# Patient Record
Sex: MALE | Race: WHITE
[De-identification: names, ages, dates, MRNs, and addresses within clinical notes are randomized per-mention and may not be internally consistent; named-entity substitution may affect disease eponyms.]

---

## 2019-08-22 ENCOUNTER — HOSPITAL ENCOUNTER (OUTPATIENT)
Age: 64
End: 2019-08-22
Payer: MEDICARE

## 2019-08-22 DIAGNOSIS — N63.41: Primary | ICD-10-CM

## 2019-08-22 PROCEDURE — 77066 DX MAMMO INCL CAD BI: CPT

## 2019-08-22 PROCEDURE — 76642 ULTRASOUND BREAST LIMITED: CPT

## 2019-08-22 NOTE — DI.MG.S_ITS
MALE BILATERAL DIGITAL DIAGNOSTIC MAMMOGRAM 3D/2D: 8/22/2019  
CLINICAL: Right subareolar/retroareolar mass.    
   
No prior exams were available for comparison.    
There is a triangular marker overlying the nipple/retroareolar region of the right breast   
at the site of the patient's reported palpable abnormality.  There is an oval indistinct   
high density mass in the right retroareolar region with associated architectural   
distortion and internal pleomorphic calcifications.  This mass is surrounded by adjacent   
moderate right retroareolar gynecomastia. There is also moderate left retroareolar   
gynecomastia.  The imaged right axillary lymph nodes appear enlarged.  
   
There are multiple bilateral mole markers and vascular calcifications.  
IMPRESSION: INCOMPLETE: NEEDS ADDITIONAL IMAGING EVALUATION  
1) Oval indistinct high density mass in the right retroareolar region with associated   
architectural distortion and internal pleomorphic calcifications. This correlates with   
the patient's palpable abnormality. Targeted diagnostic ultrasound recommended for   
further evaluation, which will be performed immediately following this exam.  
   
2) Enlarged right axillary lymph nodes.  Targeted diagnostic ultrasound recommended for   
further evaluation, which will be performed immediately following this exam.    
   
This exam was interpreted at Station ID: [id number].    
   
NOTE: For mammograms, a report in lay terms will be sent to the patient. Approximately   
15% of breast malignancies will not be visualized mammographically. In the management of   
a palpable breast mass, a negative mammogram must not discourage biopsy of a clinically   
suspicious lesion.  
   
Electronically Signed By: Kenneth Conway M.D.            
ecl/:8/22/2019 13:32:00    
   
   
   
ACR BI-RADS Category 0: Incomplete [id number]

## 2019-08-22 NOTE — DI.US.S_ITS
LIMITED ULTRASOUND OF RIGHT BREAST AND AXILLA: 8/22/2019  
CLINICAL: Palpable right breast lump.    
   
Comparison is made to exam dated:  8/22/2019 mammogram - [facility].    
Color flow and real-time ultrasound of the right breast retroareolar and axilla regions   
were performed.  Gray scale images of the real-time examination were reviewed.    
   
There is a 3.6 x 3.1 x 4.4 cm irregular heterogeneously hypoechoic mass with angular   
margins located in the right retroareolar region. This mass demonstrates internal   
echogenic foci consistent with calcifications and internal vascularity on Doppler   
ultrasound. This correlates with findings seen on comparison mammography.  
   
Targeted ultrasound of the right axilla demonstrates a 2.0 x 1.3 x 2.1 cm oval   
circumscribed hypoechoic mass with internal vascularity on Doppler ultrasound.    
   
IMPRESSION: HIGHLY SUGGESTIVE OF MALIGNANCY   
1) 4.4 cm right retroareolar mass is highly suggestive of malignancy. An ultrasound   
guided biopsy is recommended.  
2) 2.0 cm right axillary mass is most consistent with an abnormal right axillary lymph   
node and is highly suggestive of malignancy. An ultrasound guided biopsy is recommended.  
   
These results and recommendations were discussed with the patient at the time of the exam   
by the [facility] Radiologist Dr. Daron Kathleen in person.    
   
This exam was interpreted at Station ID: [id number].    
Electronically Signed By: Kenneth Conway M.D.    
ecl/:8/22/2019 15:05:30    
   
   
letter sent: Biopsy Required    
Ultrasound BI-RADS: 5 Highly suggestive of malignancy

## 2019-10-15 ENCOUNTER — HOSPITAL ENCOUNTER (OUTPATIENT)
Age: 64
End: 2019-10-15
Payer: MEDICARE

## 2019-10-15 DIAGNOSIS — Z17.0: ICD-10-CM

## 2019-10-15 DIAGNOSIS — C50.121: Primary | ICD-10-CM

## 2019-10-15 PROCEDURE — 76942 ECHO GUIDE FOR BIOPSY: CPT

## 2019-10-15 PROCEDURE — 38505 NEEDLE BIOPSY LYMPH NODES: CPT

## 2019-10-15 PROCEDURE — 19083 BX BREAST 1ST LESION US IMAG: CPT

## 2019-10-15 PROCEDURE — 77065 DX MAMMO INCL CAD UNI: CPT

## 2019-10-15 NOTE — PATH_ITS
"  
LCA Accession Number: [id number]  
.                                                                01  
Material submitted:                                        .  
PART A: axilla - RIGHT AXILLARY LYMPH NODE  
PART B: breast - RIGHT BREAST RETROAREOLAR MASS  
.                                                                01  
Clinical history:                                          .  
RIGHT BREAST CANCER  
.                                                                01  
**********************************************************************  
Diagnosis:  
A.  Right Axillary  Lymph Node , Biopsy:  
     Minute fragment of fibrous tissue.  
     Lymphoid tissue not present.  
.  
B.  Right Breast, Retroareolar, Needle Core Biopsy:  
     Invasive ductal carcinoma, well differentiated.  
      -[place] score: 5 out of 9 possible (histologic=2, nuclear=2,  
       mitotic index=1).  
      -Greatest linear length of invasive carcinoma: 1.2 cm as measured  
       from the glass slide.  
      -In situ carcinoma: Not identified.  
      -Lymphovascular invasion: Not identified.  
      -Predictive markers: Performed by immunohistochemistry, see comment  
       section.  
V  10/17/2019  1538 Local  
**********************************************************************  
.                                                                01  
Comment:  
Predictive marker immunohistochemical studies are performed on block B1  
with the invasive carcinoma showing the following results:  
.  
Estrogen receptor (SP1): Positive (>95%, strong intensity).  
Progesterone receptor (1E2): Positive (>95%, strong intensity).  
Her2 (4B5): Negative for overexpression (1+).  
.  
Internal controls for ER and CO are not present. Cold ischemic time is not  
provided. The scoring criteria for breast biomarkers by  
immunohistochemistry is based on the ASCO/CAP guidelines (Dm AC et al,  
J Clin Oncol: 2018 Jul 10;36(20):1945-1969 and Britney COLVIN et al, Arch  
Pathol Lab Med: 2010 Jun;134(6):907-22). Deparaffinized sections of  
formalin fixed tissue (along with appropriate positive controls) are  
incubated with the above antibody(s).  Using the automated [place]  
stainer, tissue is incubated with the designated antibody which is then  
localized by a non-biotin, dual polymer detection system.  The external  
controls are reviewed for appropriate reactivity and found to be adequate.  
Results on the target cell population are indicated above. These tests  
have not been validated on decalcified or alcohol-based fixed tissue. This  
test was developed and its performance characteristics determined by  
[employer]. It has not been cleared or approved by the U.S. Food and Drug  
Administration. The FDA has determined that such clearance or approval is  
not necessary. This test is used for clinical purposes. It should not be  
regarded as investigational or for research.  
.  
The results of this case are verbally provided by Dr. Shah to Nurse  
Leatha, in Dr. Chamberlain's office, on 10/16/2019 at 4:30 p.m.  
.                                                                01  
Electronically signed:                                     .  
Monica Shah MD, Pathologist  
NPI- [id number]  
.                                                                01  
Gross description:                                         .  
Received two formalin-filled containers, both labeled with the patient's  
name:  
.  
A.  In a container labeled  #1. Axilla , the specimen consists of an  
    extremely scant aggregate of light gray tissue which is filtered,  
    wrapped, and entirely submitted in cassette A. May not survive  
    processing.  
B.  In a container labeled  #2. Brst , the specimen is received with a  
    plastic filter in container, sample loose in container and consists of  
    three 0.2 cm in diameter, cylindrical-shaped portions of tissue which  
  
[id number]|2019-10-15 08:22:00|2019-10-15 10:13:00|DI.US.S_ITS|[facility]|Imaging|[id number]|"ULTRASOUND GUIDED BIOPSY RIGHT BREAST USING VACUUM DEVICE WITH MARKING DEVICE INSERTED

## 2019-10-15 NOTE — DI.US.S_ITS
ULTRASOUND GUIDED BIOPSY RIGHT BREAST WITH MARKING DEVICE INSERTED AND POST MAMMOGRAPHIC   
IMAGING: 10/15/2019  
CLINICAL: Right axillary node biopsy.    
   
PATIENT CONSENT: Risks (minor bleeding, infection, vasovagal reaction and repeat   
procedure), benefits and alternatives were explained to the patient and written informed   
consent was obtained.    
   
Correlation is made to exams dated:  8/22/2019 ultrasound and 8/22/2019 mammogram [facility].    
An ultrasound guided biopsy using real-time ultrasound was performed for the concerning   
circumscribed oval lymph node located in the right axillary tail.  This was described on   
the previous ultrasound report.  The skin was prepped in the usual manner.  Local   
anesthetic was administered to the access site.  A skin nick was made in the breast.  The   
abnormality was approached from the lateral aspect.  An 18 gauge biopsy needle was placed   
adjacent to the abnormality through an introducer device under ultrasound guidance.  Once   
the needle was documented to be in the correct location, four specimens were obtained   
using 18G Tem[place] via trochar.  A Vision clip was inserted into the biopsy cavity.  A skin   
closure strip and a sterile dressing were applied to the access site.  Post procedure   
mammographic imaging demonstrates the location device at the targeted area.  The   
specimens were sent to the laboratory for pathological analysis.    
   
   
IMPRESSION: ULTRASOUND GUIDED BIOPSY BENIGN   
Ultrasound guided biopsy of the lymph node in the right axillary tail was successful.    
Pathology indicates benign fibrosis without lymphoid tissue present.  Pathology results   
are discordant with imaging findings.  A surgical excision or sentinal node biopsy is   
recommended.    
   
   
This exam was interpreted at Station ID: [id number].    
   
Estevan Martin M.D.  
sdh,krg/:10/21/2019 19:40:02

## 2019-10-15 NOTE — DI.MG.S_ITS
MALE UNILATERAL RIGHT DIGITAL DIAGNOSTIC MAMMOGRAM POST-NEEDLE BIOPSY: 10/15/2019  
CLINICAL: Right breast cancer.    
   
Comparison is made to exam dated:  8/22/2019 [facility].    
There is a marker clip in the appropriate position in the right breast central to the   
nipple in the retroareolar region.  This is not significantly changed and correlates as   
palpated, with ultrasound findings, and the biopsy.  There is a biopsy clip and axillary   
adenopathy associated with the mass.    
   
   
IMPRESSION: POST PROCEDURE MAMMOGRAM FOR MARKER PLACEMENT  
There was a successful marker clip placement in the right breast central to the nipple in   
the retroareolar region.  The Vision marker placed at the right axillary node biopsy site   
could nt be included in this mammogram.    
   
   
This exam was interpreted at Station ID: [id number].    
   
NOTE: For mammograms, a report in lay terms will be sent to the patient. Approximately   
15% of breast malignancies will not be visualized mammographically. In the management of   
a palpable breast mass, a negative mammogram must not discourage biopsy of a clinically   
suspicious lesion.  
   
Electronically Signed By: Estevan Hughes M.D.            
sdh/:10/15/2019 17:30:04    
   
   
   
ACR BI-RADS Category Post-procedure mammogram for marker placement

## 2019-10-29 NOTE — ONC.MSW
Description: Initial Referral Navigation T/C

Activity: Called and left message for pt introducing myself as the navigator, needing some more information in order to move forward with scheduling. Requested return call.

## 2019-11-05 ENCOUNTER — HOSPITAL ENCOUNTER (INPATIENT)
Age: 64
LOS: 1 days | Discharge: HOME | DRG: 579 | End: 2019-11-06
Payer: MEDICARE

## 2019-11-05 VITALS
DIASTOLIC BLOOD PRESSURE: 88 MMHG | SYSTOLIC BLOOD PRESSURE: 138 MMHG | RESPIRATION RATE: 16 BRPM | OXYGEN SATURATION: 99 % | TEMPERATURE: 96.62 F | HEART RATE: 106 BPM

## 2019-11-05 VITALS
OXYGEN SATURATION: 96 % | DIASTOLIC BLOOD PRESSURE: 99 MMHG | RESPIRATION RATE: 14 BRPM | HEART RATE: 110 BPM | TEMPERATURE: 96.62 F | SYSTOLIC BLOOD PRESSURE: 135 MMHG

## 2019-11-05 VITALS
RESPIRATION RATE: 12 BRPM | OXYGEN SATURATION: 95 % | DIASTOLIC BLOOD PRESSURE: 87 MMHG | SYSTOLIC BLOOD PRESSURE: 124 MMHG | HEART RATE: 107 BPM

## 2019-11-05 VITALS
RESPIRATION RATE: 16 BRPM | SYSTOLIC BLOOD PRESSURE: 114 MMHG | OXYGEN SATURATION: 99 % | HEART RATE: 99 BPM | TEMPERATURE: 97.7 F | DIASTOLIC BLOOD PRESSURE: 86 MMHG

## 2019-11-05 VITALS
TEMPERATURE: 97.52 F | OXYGEN SATURATION: 100 % | DIASTOLIC BLOOD PRESSURE: 84 MMHG | SYSTOLIC BLOOD PRESSURE: 128 MMHG | HEART RATE: 116 BPM | RESPIRATION RATE: 18 BRPM

## 2019-11-05 VITALS — BODY MASS INDEX: 33.2 KG/M2

## 2019-11-05 VITALS
HEART RATE: 104 BPM | DIASTOLIC BLOOD PRESSURE: 47 MMHG | OXYGEN SATURATION: 95 % | SYSTOLIC BLOOD PRESSURE: 102 MMHG | RESPIRATION RATE: 11 BRPM

## 2019-11-05 VITALS
RESPIRATION RATE: 14 BRPM | HEART RATE: 108 BPM | DIASTOLIC BLOOD PRESSURE: 61 MMHG | SYSTOLIC BLOOD PRESSURE: 107 MMHG | OXYGEN SATURATION: 97 %

## 2019-11-05 VITALS
SYSTOLIC BLOOD PRESSURE: 114 MMHG | TEMPERATURE: 97.34 F | RESPIRATION RATE: 11 BRPM | OXYGEN SATURATION: 97 % | DIASTOLIC BLOOD PRESSURE: 88 MMHG | HEART RATE: 109 BPM

## 2019-11-05 VITALS
OXYGEN SATURATION: 97 % | RESPIRATION RATE: 14 BRPM | DIASTOLIC BLOOD PRESSURE: 80 MMHG | SYSTOLIC BLOOD PRESSURE: 116 MMHG | HEART RATE: 95 BPM

## 2019-11-05 VITALS
DIASTOLIC BLOOD PRESSURE: 74 MMHG | HEART RATE: 110 BPM | OXYGEN SATURATION: 95 % | TEMPERATURE: 97.34 F | RESPIRATION RATE: 15 BRPM | SYSTOLIC BLOOD PRESSURE: 104 MMHG

## 2019-11-05 VITALS
HEART RATE: 110 BPM | OXYGEN SATURATION: 95 % | DIASTOLIC BLOOD PRESSURE: 68 MMHG | RESPIRATION RATE: 13 BRPM | SYSTOLIC BLOOD PRESSURE: 113 MMHG

## 2019-11-05 VITALS
RESPIRATION RATE: 11 BRPM | SYSTOLIC BLOOD PRESSURE: 100 MMHG | OXYGEN SATURATION: 94 % | DIASTOLIC BLOOD PRESSURE: 68 MMHG | HEART RATE: 100 BPM

## 2019-11-05 VITALS
HEART RATE: 104 BPM | OXYGEN SATURATION: 100 % | RESPIRATION RATE: 16 BRPM | DIASTOLIC BLOOD PRESSURE: 76 MMHG | TEMPERATURE: 97.4 F | SYSTOLIC BLOOD PRESSURE: 133 MMHG

## 2019-11-05 VITALS
HEART RATE: 107 BPM | RESPIRATION RATE: 16 BRPM | DIASTOLIC BLOOD PRESSURE: 84 MMHG | SYSTOLIC BLOOD PRESSURE: 129 MMHG | TEMPERATURE: 96.62 F | OXYGEN SATURATION: 94 %

## 2019-11-05 VITALS
OXYGEN SATURATION: 97 % | DIASTOLIC BLOOD PRESSURE: 71 MMHG | HEART RATE: 104 BPM | RESPIRATION RATE: 16 BRPM | SYSTOLIC BLOOD PRESSURE: 102 MMHG | TEMPERATURE: 98.78 F

## 2019-11-05 DIAGNOSIS — Z23: ICD-10-CM

## 2019-11-05 DIAGNOSIS — C50.921: Primary | ICD-10-CM

## 2019-11-05 DIAGNOSIS — Z99.2: ICD-10-CM

## 2019-11-05 DIAGNOSIS — I12.0: ICD-10-CM

## 2019-11-05 DIAGNOSIS — N18.6: ICD-10-CM

## 2019-11-05 LAB
HEMOLYSIS: 15 (ref 0–50)
POTASSIUM SERPL-SCNC: 5.2 MMOL/L (ref 3.4–5.1)

## 2019-11-05 PROCEDURE — 90471 IMMUNIZATION ADMIN: CPT

## 2019-11-05 PROCEDURE — 36415 COLL VENOUS BLD VENIPUNCTURE: CPT

## 2019-11-05 PROCEDURE — 19302 P-MASTECTOMY W/LN REMOVAL: CPT

## 2019-11-05 PROCEDURE — 78195 LYMPH SYSTEM IMAGING: CPT

## 2019-11-05 PROCEDURE — 82962 GLUCOSE BLOOD TEST: CPT

## 2019-11-05 PROCEDURE — A9541 TC99M SULFUR COLLOID: HCPCS

## 2019-11-05 PROCEDURE — 90656 IIV3 VACC NO PRSV 0.5 ML IM: CPT

## 2019-11-05 PROCEDURE — 0HBT0ZZ EXCISION OF RIGHT BREAST, OPEN APPROACH: ICD-10-PCS | Performed by: SURGERY

## 2019-11-05 PROCEDURE — 84132 ASSAY OF SERUM POTASSIUM: CPT

## 2019-11-05 PROCEDURE — 80048 BASIC METABOLIC PNL TOTAL CA: CPT

## 2019-11-05 NOTE — PM.OP.1
Operative Date/Time/Diagnoses
Date of procedure: 11/05/19
Time of procedure: 15:59
Pre-op diagnosis: Right breast cancer 

Post-op diagnosis: same

Procedure & Clinicians
Procedure: Right mastectomy, right partial axillary lymph node dissection
Same procedure as scheduled: Yes
Indications: This is a 64-year-old male with biopsy-proven invasive ductal carcinoma of the right breast with a 4.5 cm mass.  In addition he has prominent palpable axillary lymphadenopathy.  He underwent a needle-guided biopsy of the axillary tissue 
but this was nondiagnostic as no lymphoid tissue was obtained.  He presents today for a right mastectomy sentinel lymph node biopsy and possible limited axillary dissection.
Surgeon: Rogelio Chamberlain
Anesthesia Type: General
Operative Notes
Findings: Palpable enlarged axillary lymph nodes.  Large right retroaerolar breast mass
Specimen(s): other (right breast, right axillary nodes)
Estimated Blood Loss (mL): 20
Procedure in detail: The patient was brought to the operating room placed supine on the table.  Bilateral lower extremity compression devices were applied.  He received 2 g of Ancef prior to skin incision.  General anesthesia was induced and he was 
intubated with an LMA.  He was then prepped and draped in sterile fashion.  A time-out was performed to ensure the correct patient procedure necessary equipment within the operating room.  4 mL of methylene blue was injected in the subcutaneous 
tissue of the aerola and then massaged into the skin.  Elliptical incision around the aerola complex was made.  The subcutaneous tissue was divided with electrocautery.  Subcutaneous flaps were raised superiorly to the level of the clavicle medially 
to the sternum and laterally to the anterior margin of the latissimus dorsi.  The dissection was then carried down to the level of the pectoralis.  The breast tissue was carefully excised from the pectoralis in its entirety and passed off the field 
as specimen was marked short stitch superior long stitch lateral.  The axilla could be accessed through the mastectomy incision.  The DxContinuum counter was used to identify the sentinel lymph node.  This lymph node was grasped elevated and dissected 
out of the axilla using electrocautery and passed off the field as specimen.  It was taken to pathology where frozen section was performed.  I spoke with the pathologist intraoperatively they were unable to identify any cancer within the lymph node 
but it was enlarged, firm and concerning for malignancy.  In speaking with the pathologist the sensitivity of the frozen section for the detection of axillary disease is moderate and therefore with a large breast cancer in a male with palpable 
lymphadenopathy I proceeded to perform a very limited axillary dissection.  Only the grossly enlarged lymph nodes from the axilla were meticulously excised from the axillary fat pad.   A formal axillary dissection was not performed.  Hemostasis was 
obtained.  A 14F ADAM was placed into the axilla and the mastectomy wound.  The subcutaneous tissue was reapproximated with vicyl suture and the skin closed with staples.  
Complications: none
Post-operative
Condition: stable
Disposition: observation

## 2019-11-05 NOTE — PATH_ITS
"  
LCA Accession Number: [id number]  
.                                                                01  
Material submitted:                                        .  
PART A: breast - RIGHT BREAST  
PART B: lymph node - RIGHT AXILLARY SENTINEL NODE  
PART C: lymph node - RIGHT AXILLARY NODES  
PART D: lymph node - RIGHT AXILLARY NODES  
.                                                                02  
Frozen section diagnosis:                                       .  
FSB:  TWO LYMPH NODES NEGATIVE FOR TUMOR (HALF OF TWO LYMPH NODES FROZEN).  
.  
Reported to Dr. Chamberlain.  Pathologist was Dr. Bennett, 11/05/2019. Frozen  
section was performed at [address].  
JAMES/BEATA  
.                                                                01  
**********************************************************************  
Diagnosis:  
A.  Right Breast, Simple Mastectomy:  
     Invasive (ductal) carcinoma, grade 2 of 3 (Jonathan combined  
      histologic grade, total score 7/9), with the following features:  
      1. Tumor size (invasive component): 4.2 cm, by gross measurement.  
         (42 x 41 x 31 mm)  
      2. Tubular differentiation: Little or none. (3/3)  
      3. Nuclear pleomorphism: Intermediate. (2/3)  
      4. Mitotic rate: Intermediate. (2/3)  
      5. Ductal carcinoma in situ (DCIS): Focally present, with the  
          following features:  
          a. Nuclear grade: Intermediate to high.  
          b. Necrosis: Focally present (central  comedo  necrosis).  
          c. Extent: Present on two slides, largest microscopic dimension  
             of approximately 1 cm.  
      6. Calcifications: Present in association with DCIS and invasive  
         carcinoma.  
      7. Lymphovascular space invasion: Focally present.  
      8. Resection margins:  
          a. Invasive carcinoma: Negative; invasive carcinoma is 0.2 cm  
             from the closest superficial inferior margin, and more than  
             1 cm from the remaining margins.  
          b. DCIS: Negative; DCIS is more than 1 cm from all margins.  
      9. Prognostic markers ([facility]  
          #[id number], 10/15/2019):  
          Per the report:  
           a. Estrogen receptor: Positive (>95%, strong intensity).  
           b. Progesterone receptor: Positive (>95%, strong intensity).  
           C. HER2: Negative for overexpression (1+) by IHC.  
    10. Regional lymph node status (see parts B-D below):  
         a. Metastic carcinoma in 1 out of 8 sentinel lymph nodes (1/8).  
            i. Size of largest metastatic deposit: 0.7 mm.  
           ii. Extranodal extension: Focally present.  
         b. 11 axillary lymph nodes, negative for malignancy (0/11).  
    11. Additional findings:  
         Skin: No evidence of Paget disease or ulceration; seborrheic  
          keratosis and intradermal nevus are present.  
         Nipple: stroma is involved by carcinoma; see microscopic  
          description.  
         Skeletal muscle: Present and uninvolved.  
         Biopsy site: Present.  
    12. Pathologic stage: pT2 pN1mi(sn).  
.  
B.  Right Axillary  Patriot Lypmh Node , Dissection:  
     Metastatic carcinoma in 1 out of 8 lymph nodes (1/8).  
     Size of largest metastatic deposit: 0.7 mm.  
     Extranodal extension: Focally present.  
.  
C.  Right Axillary Lymph Nodes, Excisional Biopsy:  
     1 lymph node, negative for malignancy (0/1).  
.  
D.  Right Axillary Lymph Nodes, Dissection:  
     10 lymph nodes, negative for malignancy (0/10).  
[facility]  11/08/2019  2254 Local  
**********************************************************************  
.                                                                01  
Comment:  
The frozen section slides are reviewed, no definite tumor is identified.  
The metastatic deposits are only seen on the permanent sections of t
[id number]|2019-11-06 06:30:21|2019-11-06 06:30:21|PC.NURSE||||"Dr Chamberlain aware of pt's urine output, patient in total renal failure and does not make much if any urine typically, is on hemodialysis. "

## 2019-11-05 NOTE — PC.NURSE
Addendum entered by Jodi Ghosh R.N.  11/05/19 23:40: 

pt would like to get his flu vaccine in the morning. 

Original Note:

1xABD pad was saturated. Dr. Chamberlain came to check on patient and he changed his dressing. new dressing cdi. ice pack applied. call light in reach. bed alarm active.

## 2019-11-05 NOTE — PM.HP.1
"History of Present Illness
History of Present Illness
Date Patient Seen: 11/05/19
Time Patient Seen: 13:11
Chief complaint: RIGHT BREAST CANCER
Narrative: This is a 64-year-old male with biopsy-proven right invasive breast cancer and palpable axillary lymphadenopathy.  In the interval he underwent a needle-guided biopsy of the right axillary lymph node which was nondiagnostic and returned 
no lymphoid tissue.  He is here today for a right mastectomy sentinel node biopsy and possible axillary lymph node dissection.  Please refer to the H&P dated 09/12/2019 for further detail.  There been no other interval changes to his health

Patient History
Medical History (Reviewed 11/05/19 @ 13:12 by Rogelio Cahmberlain MD)

Afib (Acute ~2011)
Alcohol intoxication (Inactive)
Alcoholic hepatitis (Acute)
Anemia due to chronic kidney disease (Inactive)
Cholecystitis (Inactive)
Chronic anemia (Acute)
Diverticulitis (Acute)
End stage renal failure on dialysis (Acute)
Facial contusion (Inactive)
GI bleed (Acute ~2012)
Gout (Acute)
HLD (hyperlipidemia) (Acute)
HTN (hypertension) (Acute)
Neuropathy (Acute)
Orthopedic trauma (Acute)
Pancreatitis (Inactive)
Pancreatitis due to biliary obstruction (Inactive)
Pneumonia (Acute ~2012)
Presence of surgically created AV shunt for hemodialysis (Acute)
Renal failure (ARF), acute on chronic (Inactive)
Sepsis (Acute ~2012)
Systolic heart failure (Acute)
Thyroid mass (Inactive)


Family & Social History
Social History:  

household members              none


Tobacco & Substance use:  

Smoking Status                 Former smoker



Meds
Home Medications and Allergies
Home Medications

 Medication  Instructions  Recorded  Confirmed  Type
fluticasone propionate 2 spray INTRANASAL QDAYP #0 11/16/12 10/31/19 History
hydrochlorothiazide 25 mg PO DAILY #0 11/16/12 10/31/19 History
lisinopril [Prinivil] 20 mg PO QDAY #0 11/16/12 10/31/19 History
simvastatin 10 mg PO QDAY #0 11/16/12 10/31/19 History
folic acid 2 mg PO QDAY #0 06/14/17 10/31/19 History
metoprolol succinate [Toprol XL] 25 mg PO QDAY #0 06/14/17 11/05/19 History
diltiazem HCl [Cartia XT] 120 mg PO DAILY 11/05/19 11/05/19 History
omeprazole 20 mg PO DAILY 11/05/19 11/05/19 History


Allergies

Allergy/AdvReac Type Severity Reaction Status Date / Time
aspirin Allergy Mild can't Verified 10/29/19 10:02
   tolerate  
   high doses  



Review of Systems
Review of Systems
ROS Unobtainable: All systems reviewed & are unremarkable except as noted in HPI and below

Exam
Vital Signs
(past 8 hours):  -

 11/05/19
11:54
Temperature 98.7 F
Pulse Rate 104 H
Respiratory Rate 16
Blood Pressure 102/71
Pulse Oximetry 97



Oxygen Delivery Method         Room Air



Narrative
Exam Narrative: General-no acute distress, well nourished
HEENT-moist mucous membranes, no scleral icterus
Neck-supple, no lymphadenopathy
Chest- Palpable retroareolar right breast mass and palpable axillary lymphadenopathy
Cardiac-regular rate no peripheral edema
Abdomen-soft, nontender, non distended
Extremities-warm, well perfused
Neurological-alert and oriented, no focal deficits

Objective
Labs

Result Diagrams: 
11/05/19 11:57          

Labs:  Laboratory Results - last 24 hr

  11/05/19
  11:57
Potassium  5.2 H



Assessment & Plan
Assessment and plan
(1) Breast mass in male: 
      Current visit: No
      Status: Acute
Assessment & Plan narrative: This is a 64-year-old male with biopsy-proven invasive right breast cancer.  He has a 4-1/2 cm right retroareolar mass with needle biopsy demonstrating invasive ductal carcinoma.  Clinically he has palpable axillary 
lymphadenopathy but an image guided biopsy of the axilla was nondiagnostic as no lymphoid tissue was retrieved.  I suspect that he had likely has locally advanced disease with axillary involvement.  To treat his breast cancer he needs a mastectomy 
and at least sentinel lymph node biopsy. As he is unlikely to recie
[id number]|2019-11-05 14:02:28|2019-11-05 14:02:28|ZEESHAN.OPER||||"Supine on padded OR bed, head on pillow, arms secured on padded arm boards at <90 degrees abduction, legs uncrossed, safety belt at thigh, tape over blanket over lower legs.PILLOW UNDER KNEES"

## 2019-11-05 NOTE — P.HP_ITS
"History of Present Illness    
History of Present Illness    
Date Patient Seen: 11/05/19    
Time Patient Seen: 13:11    
Chief complaint: RIGHT BREAST CANCER    
Narrative: This is a 64-year-old male with biopsy-proven right invasive breast   
cancer and palpable axillary lymphadenopathy.  In the interval he underwent a   
needle-guided biopsy of the right axillary lymph node which was nondiagnostic   
and returned no lymphoid tissue.  He is here today for a right mastectomy   
sentinel node biopsy and possible axillary lymph node dissection.  Please refer   
to the H&P dated 09/12/2019 for further detail.  There been no other interval   
changes to his health    
    
Patient History    
Medical History (Reviewed 11/05/19 @ 13:12 by Rogelio Chambrelain MD)    
    
Afib (Acute ~2011)    
Alcohol intoxication (Inactive)    
Alcoholic hepatitis (Acute)    
Anemia due to chronic kidney disease (Inactive)    
Cholecystitis (Inactive)    
Chronic anemia (Acute)    
Diverticulitis (Acute)    
End stage renal failure on dialysis (Acute)    
Facial contusion (Inactive)    
GI bleed (Acute ~2012)    
Gout (Acute)    
HLD (hyperlipidemia) (Acute)    
HTN (hypertension) (Acute)    
Neuropathy (Acute)    
Orthopedic trauma (Acute)    
Pancreatitis (Inactive)    
Pancreatitis due to biliary obstruction (Inactive)    
Pneumonia (Acute ~2012)    
Presence of surgically created AV shunt for hemodialysis (Acute)    
Renal failure (ARF), acute on chronic (Inactive)    
Sepsis (Acute ~2012)    
Systolic heart failure (Acute)    
Thyroid mass (Inactive)    
    
    
Family & Social History    
Social History:                             
    
household members                none    
    
    
Tobacco & Substance use:                    
    
Smoking Status                   Former smoker    
    
    
    
Meds    
Home Medications and Allergies    
                                Home Medications    
    
    
    
 Medication  Instructions  Recorded  Confirmed  Type    
     
fluticasone propionate 2 spray INTRANASAL QDAYP #0 11/16/12 10/31/19 History    
     
hydrochlorothiazide 25 mg PO DAILY #0 11/16/12 10/31/19 History    
     
lisinopril [Prinivil] 20 mg PO QDAY #0 11/16/12 10/31/19 History    
     
simvastatin 10 mg PO QDAY #0 11/16/12 10/31/19 History    
     
folic acid 2 mg PO QDAY #0 06/14/17 10/31/19 History    
     
metoprolol succinate [Toprol XL] 25 mg PO QDAY #0 06/14/17 11/05/19 History    
     
diltiazem HCl [Cartia XT] 120 mg PO DAILY 11/05/19 11/05/19 History    
     
omeprazole 20 mg PO DAILY 11/05/19 11/05/19 History    
    
    
    
                                    Allergies    
    
    
    
Allergy/AdvReac Type Severity Reaction Status Date / Time    
     
aspirin Allergy Mild can't Verified 10/29/19 10:02    
    
   tolerate      
    
   high doses      
    
    
    
    
Review of Systems    
Review of Systems    
ROS Unobtainable: All systems reviewed & are unremarkable except as noted in HPI  
and below    
    
Exam    
Vital Signs    
(past 8 hours):                         -    
    
    
    
 11/05/19    
11:54    
     
Temperature 98.7 F    
     
Pulse Rate 104 H    
     
Respiratory Rate 16    
     
Blood Pressure 102/71    
     
Pulse Oximetry 97    
    
    
                                            
    
    
    
Oxygen Delivery Method         Room Air    
    
    
    
    
Narrative    
Exam Narrative: General-no acute distress, well nourished    
HEENT-moist mucous membranes, no scleral icterus    
Neck-supple, no lymphadenopathy    
Chest- Palpable retroareolar right breast mass and palpable axillary   
lymphadenopathy    
Cardiac-regular rate no peripheral edema    
Abdomen-soft, nontender, non distended    
Extremities-warm, well perfused    
Neurological-alert and oriented, no focal deficits    
    
Objective    
Labs    
    
Resu
[id number]|2019-11-05 15:59:00|2019-11-05 15:59:00|P.OP_ITS|[facility]|Health Information Management|[id number]|" Operative Date/Time/Diagnoses

## 2019-11-05 NOTE — SUR.PHASEI
Patient arrived in PACU somnolent but arouses to voice. Drsg has small amount of red drainage. ADAM draining serosanguinous. Left UA +thrill, +bruit. Patient TBA for obs.

## 2019-11-06 VITALS
HEART RATE: 98 BPM | SYSTOLIC BLOOD PRESSURE: 130 MMHG | DIASTOLIC BLOOD PRESSURE: 77 MMHG | RESPIRATION RATE: 16 BRPM | TEMPERATURE: 97.52 F | OXYGEN SATURATION: 97 %

## 2019-11-06 VITALS
HEART RATE: 110 BPM | SYSTOLIC BLOOD PRESSURE: 129 MMHG | RESPIRATION RATE: 18 BRPM | OXYGEN SATURATION: 100 % | DIASTOLIC BLOOD PRESSURE: 71 MMHG | TEMPERATURE: 97.4 F

## 2019-11-06 VITALS — DIASTOLIC BLOOD PRESSURE: 71 MMHG | HEART RATE: 111 BPM | SYSTOLIC BLOOD PRESSURE: 129 MMHG

## 2019-11-06 LAB
BUN SERPL-MCNC: 54 MG/DL (ref 9–20)
CALCIUM SERPL-MCNC: 10.8 MG/DL (ref 8.4–10.2)
CHLORIDE SERPL-SCNC: 93 MMOL/L (ref 98–107)
CO2 SERPL-SCNC: 25 MMOL/L (ref 22–32)
ESTIMATED GLOMERULAR FILT RATE: 6.9 ML/MIN (ref 60–?)
GLUCOSE SERPL-MCNC: 167 MG/DL (ref 80–110)
HEMOLYSIS: < 15 (ref 0–50)
POTASSIUM SERPL-SCNC: 6 MMOL/L (ref 3.4–5.1)
SODIUM SERPL-SCNC: 134 MMOL/L (ref 137–145)

## 2019-11-06 NOTE — PM.DS.1
History of Present Illness
History of Present Illness
Chief complaint: RIGHT BREAST CANCER
Narrative: This is a 64-year-old male with biopsy-proven invasive right breast cancer.  He has a 4-1/2 cm right retroareolar mass with needle biopsy demonstrating invasive ductal carcinoma.  Clinically he has palpable axillary lymphadenopathy but 
biopsy of the axilla was nondiagnostic as no lymphoid tissue was retrieved.  I suspect that he had likely has axillary disease.  He presents today for mastectomy sentinel node biopsy and possible axillary dissection.

Discharge Providers
Provider
Discharge Date: 11/06/19
Primary care physician: Harrison Curtis

Discharge provider: Rogelio Chamberlain MD


Summary
Hospital Course
Discharge Diagnosis: Status post mastectomy
ESRD on hemodialysis
HTN
HLD

Hospital Course: Patient underwent a right mastectomy and partial axillary lymph node dissection 11/05/2019.  The operation was unremarkable.  Given his significant comorbidities he remained overnight in the hospital under observation.  His hospital 
stay was uneventful and he is ready for discharge on post operative day 1. He will be receiving his normal dialysis today, Wednesday 11/6/19.  Will return to clinic in 1 week for removal of surgical drain.
Status at Discharge
Cognitive/behavioral status at discharge: oriented
Overall status at discharge: patient is back to baseline
Time Spent with Patient
Time spent: Greater than 30 minutes

Exam
Vital Signs
(past 8 hours):  -

 11/05/19
23:49 11/06/19
03:52
Temperature 97.7 F 97.6 F
Pulse Rate 99 H 98 H
Respiratory Rate 16 16
Blood Pressure 114/86 130/77
Pulse Oximetry 99 97



Oxygen Delivery Method         Room Air
Oxygen Flow Rate               0



Narrative
Exam Narrative: General adult male alert oriented no acute distress
Chest nonlabored respirations.  Mastectomy incision clean dry intact with staples.  ADAM drain to bulb suction with 30 mL of serosanguineous fluid.
Abdomen soft nontender nondistended

Objective
Labs

Result Diagrams: 
11/06/19 05:30          

Labs:  Laboratory Results - last 24 hr

  11/05/19 11/06/19
  11:57 05:30
Sodium   134 L
Potassium  5.2 H  6.0 H
Chloride   93 L
Carbon Dioxide   25
BUN   54 H
Creatinine   7.90 H*
Estimated GFR   6.9 L
BUN/Creatinine Ratio   6.8
Glucose   167 H
Calcium   10.8 H



Discharge Plan
Discharge Plan
Patient Disposition: Home

Discharge Med Rec/Prescriptions
Prescriptions:
New
  tramadol 50 mg tablet 
   50 mg PO Q6H PRN (Reason: pain) Qty: 30 RF: 0
  acetaminophen [Tylenol] 325 mg capsule 
   650 mg PO QID PRN (Reason: pain) Qty: 60 RF: 0

Continued
  hydrochlorothiazide 25 mg Tablet 
   25 mg PO DAILY Qty: 0 RF: 0
  lisinopril [Prinivil] 20 MG tablet 
   20 mg PO QDAY Qty: 0 RF: 0
  simvastatin 10 MG tablet 
   10 mg PO QDAY Qty: 0 RF: 0
  fluticasone propionate 16 GM spray,suspension 
   2 spray Intranasal QDAYP Qty: 0 RF: 0
  metoprolol succinate [Toprol XL] 25 MG tablet extended release 24 hr 
   25 mg PO QDAY Qty: 0 RF: 0
  folic acid 1 MG tablet 
   2 mg PO QDAY Qty: 0 RF: 0
  omeprazole 20 mg Capsule,Delayed Release(Dr/Ec) 
   20 mg PO DAILY RF: 0
  diltiazem HCl [Cartia XT] 120 mg Capsule,Extended Release 24hr 
   120 mg PO DAILY RF: 0

Follow up/Referrals:
Rogelio Chamberlain MD [Physician] -  (Follow up 1 week for drain removal)

Discharge Orders:
Discharge  (Order); Ordered 11/06/19
   Ordered By:  Rogelio Chamberlain

Provider Discharge Instructions
Diet: Diet as Tolerated


Activity: May shower beginning 11/7/19 but do not submerge the wound.  Keep drain to bulb suction no driving well taking narcotic 



Skin/Wound/Dressing Care
Report to your healthcare provider any signs of infection, such as:: chills, fever, increased pain and unusual drainage


Visit Report/Discharge Packet
Instructions:  DI for Mastectomy

Discharge Data
Primary Care Provider: Harrison Curtis

Attending Provider: Rogelio Chamberlain

## 2019-11-06 NOTE — CM.DANOTE
"Patient is a 64 year old male who was admitted on 11/05/19 for Right Breast Mastectomy.  Pt has MCR and ASHLEY for insurance and his PCP is Dr. Harrison Curtis.  EMR was reviewed.  Per Surgeon, pt medically stable to d/c home today with caregiver support. 
 Per RN, pt stating he needs assist with setting up transport home and is mostly w/c bound but can walk a couple steps and transfer.

SW met bedside with pt and explained role and pt confirms that he lives in an apartment in [place] alone but has a couple caregivers that he thinks he has through Medicaid but also private pay.  Pt states he has a hard time remembering all of his 
contact/resources but requested SW to call his caregiver Octavio ([phone]) as Octavio knows all of his medical and resource information.  Pt has dialysis weekly at the [facility] in [place] that is 4 blocks from his house and he 
is scheduled for dialysis today around 1300 which is why pt is being discharged so quickly.

DORCAS called Octavio who confirms that he helps the pt with coordination of services and help in the home along with another lady caregiver.  Octavio attempted to set up Medicaid transport for the pt but was unable to set it up as he was unsure what 
time pt would be discharged.

DORCAS faxed Medicaid transport form with request of w/c van transport home and received a call back that [employer] transport can pick pt up at 0945.  DORCAS called Octavio with update and he is very appreciative and will be at pt's apartment to help get him 
food and settled before getting him to dialysis later today.  DORCAS confirmed that pt's home meds, life alert, and clothes are bedside and HUC getting pt's wallet from the safe.

DORCAS updated RN and Surgeon on pt d/c home around 0945 today.

Plan:  Patient to d/c home via [employer] transport with caregiver Octavio to meet pt at home for assist.

LAUREN Tobias

 
Discharge Planning/Care Management

CM Discharge Assessment                                    Start:  11/06/19 09:09
Freq:                                                      Status: Active        
Protocol:                                                                        
 Document     11/06/19 09:09  BF  (Rec: 11/06/19 09:27    [id number])
 Discharge Planning Assessment
     Assigned Discharge Planner                  LAUREN Fisher
     Advance Directives?                         Yes
     Advance Directives on File                  Yes
     History Provided By                         Patient,Medical Record
     Has Patient been admitted in last 30        No
      days?                                      
     Prior Living Arrangements                   Apartment/Condo
     Household Members                           none
     Type of transporation used prior to         Medicaid Transport
      admit                                      
     Independent with ADL's                      No: Has caregivers at home for
                                                 assist
     Is patient alert and oriented?              Yes
     Needs Assistance With                       Managing Medications,Home
                                                 Chores / Shopping
     Caregiver for Another                       No
     Comment                                     Dialysis scheduled weekly for
                                                 the pt near his home on
                                                 [facility]
     DME Already Rented / Owned                  Bath Bench,Wheelchair
     Comment                                     Home with caregivers
     Barriers to Discharge                       No
     Discharge Plan                              Home
     Transportation Arrangement                  Set up Medicaid w/c van
                                                 transport home
     Referrals Initiated                         None needed
     Whiteboard Updated in Patient Room with     Yes
      n
[id number]|2019-11-06 10:19:26|2019-11-06 10:19:26|PC.NURSE||||"Discharge

## 2019-11-06 NOTE — P.DS_ITS
"History of Present Illness    
History of Present Illness    
Chief complaint: RIGHT BREAST CANCER    
Narrative: This is a 64-year-old male with biopsy-proven invasive right breast   
cancer.  He has a 4-1/2 cm right retroareolar mass with needle biopsy   
demonstrating invasive ductal carcinoma.  Clinically he has palpable axillary   
lymphadenopathy but biopsy of the axilla was nondiagnostic as no lymphoid tissue  
was retrieved.  I suspect that he had likely has axillary disease.  He presents   
today for mastectomy sentinel node biopsy and possible axillary dissection.    
    
Discharge Providers    
Provider    
Discharge Date: 11/06/19    
Primary care physician: Harrison Curtis    
    
Discharge provider: Rogelio Chamberlain MD    
    
    
Summary    
Hospital Course    
Discharge Diagnosis: Status post mastectomy    
ESRD on hemodialysis    
HTN    
HLD    
    
Hospital Course: Patient underwent a right mastectomy and partial axillary lymph  
node dissection 11/05/2019.  The operation was unremarkable.  Given his   
significant comorbidities he remained overnight in the hospital under   
observation.  His hospital stay was uneventful and he is ready for discharge on   
post operative day 1. He will be receiving his normal dialysis today, Wednesday 11/6/19.  Will return to clinic in 1 week for removal of surgical drain.    
Status at Discharge    
Cognitive/behavioral status at discharge: oriented    
Overall status at discharge: patient is back to baseline    
Time Spent with Patient    
Time spent: Greater than 30 minutes    
    
Exam    
Vital Signs    
(past 8 hours):                         -    
    
    
    
 11/05/19    
23:49 11/06/19    
03:52    
     
Temperature 97.7 F 97.6 F    
     
Pulse Rate 99 H 98 H    
     
Respiratory Rate 16 16    
     
Blood Pressure 114/86 130/77    
     
Pulse Oximetry 99 97    
    
    
                                            
    
    
    
Oxygen Delivery Method         Room Air    
     
Oxygen Flow Rate               0    
    
    
    
    
Narrative    
Exam Narrative: General adult male alert oriented no acute distress    
Chest nonlabored respirations.  Mastectomy incision clean dry intact with   
staples.  ADAM drain to bulb suction with 30 mL of serosanguineous fluid.    
Abdomen soft nontender nondistended    
    
Objective    
Labs    
    
Result Diagrams:     
                                                        11/06/19 05:30              
    
Labs:                    Laboratory Results - last 24 hr    
    
    
    
  11/05/19 11/06/19    
    
  11:57 05:30    
     
Sodium   134 L    
     
Potassium  5.2 H  6.0 H    
     
Chloride   93 L    
     
Carbon Dioxide   25    
     
BUN   54 H    
     
Creatinine   7.90 H*    
     
Estimated GFR   6.9 L    
     
BUN/Creatinine Ratio   6.8    
     
Glucose   167 H    
     
Calcium   10.8 H    
    
    
    
    
Discharge Plan    
Discharge Plan    
Patient Disposition: Home    
    
Discharge Med Rec/Prescriptions    
Prescriptions:    
New    
  tramadol 50 mg tablet     
   50 mg PO Q6H PRN (Reason: pain) Qty: 30 RF: 0    
  acetaminophen [Tylenol] 325 mg capsule     
   650 mg PO QID PRN (Reason: pain) Qty: 60 RF: 0    
    
Continued    
  hydrochlorothiazide 25 mg Tablet     
   25 mg PO DAILY Qty: 0 RF: 0    
  lisinopril [Prinivil] 20 MG tablet     
   20 mg PO QDAY Qty: 0 RF: 0    
  simvastatin 10 MG tablet     
   10 mg PO QDAY Qty: 0 RF: 0    
  fluticasone propionate 16 GM spray,suspension     
   2 spray Intranasal QDAYP Qty: 0 RF: 0    
  metoprolol succinate [Toprol XL] 25 MG tablet extended release 24 hr     
   25 mg PO QDAY Qty: 0 RF: 0    
  folic acid 1 MG tablet     
   2 mg PO QDAY Qty: 0 RF: 0    
  omeprazole 20 mg Capsule,Delayed Release(Dr/Ec)     
   20 mg PO DAILY RF: 0    
  diltiazem HCl [Cartia XT] 120 mg Capsule,Extended R
[id number]|2019-11-05 09:45:00|2019-11-05 16:17:00|ÁLVARO.S_ITS|[facility]|Imaging|[id number]|"PROCEDURE:  NM SENTINEL NODE W IMAGING

## 2019-11-07 NOTE — ONC.MSW
Description: T/C re: scheduling referral

Activity: Left second message for pt re: his referral to ONC and navigator's attempt to assist him in getting scheduled. Requested a return call.

## 2020-05-12 ENCOUNTER — HOSPITAL ENCOUNTER (OUTPATIENT)
Age: 65
End: 2020-05-12
Payer: MEDICARE

## 2020-05-12 VITALS — BODY MASS INDEX: 33.2 KG/M2

## 2020-05-12 DIAGNOSIS — C50.929: Primary | ICD-10-CM

## 2020-05-12 LAB
ADD MANUAL DIFF / SLIDE REVIEW: NO
ALBUMIN SERPL-MCNC: 4.4 G/DL (ref 3.5–5)
ALBUMIN/GLOB SERPL: 1.2 {RATIO} (ref 1–2.8)
ALP SERPL-CCNC: 101 U/L (ref 38–126)
ALT SERPL-CCNC: 16 IU/L (ref ?–50)
BUN SERPL-MCNC: 44 MG/DL (ref 9–20)
CALCIUM SERPL-MCNC: 8.6 MG/DL (ref 8.4–10.2)
CHLORIDE SERPL-SCNC: 98 MMOL/L (ref 98–107)
CO2 SERPL-SCNC: 28 MMOL/L (ref 22–32)
ESTIMATED GLOMERULAR FILT RATE: 6.8 ML/MIN (ref 60–?)
GLOBULIN SER CALC-MCNC: 3.7 G/DL (ref 1.7–4.1)
GLUCOSE SERPL-MCNC: 98 MG/DL (ref 80–110)
HEMATOCRIT: 34.1 % (ref 41–53)
HEMOGLOBIN: 11.2 G/DL (ref 13.5–17.5)
HEMOLYSIS: < 15 (ref 0–50)
LYMPHOCYTES # SPEC AUTO: 700 /UL (ref 1100–4500)
MCV RBC: 101.8 FL (ref 80–100)
MEAN CORPUSCULAR HEMOGLOBIN: 33.3 PG (ref 26–34)
MEAN CORPUSCULAR HGB CONC: 32.7 % (ref 30–36)
PLATELET COUNT: 124 X10^3/UL (ref 150–400)
POTASSIUM SERPL-SCNC: 4.9 MMOL/L (ref 3.4–5.1)
PROT SERPL-MCNC: 8.1 G/DL (ref 6.3–8.2)
SODIUM SERPL-SCNC: 139 MMOL/L (ref 137–145)

## 2020-05-12 PROCEDURE — 85025 COMPLETE CBC W/AUTO DIFF WBC: CPT

## 2020-05-12 PROCEDURE — 36415 COLL VENOUS BLD VENIPUNCTURE: CPT

## 2020-05-12 PROCEDURE — 80053 COMPREHEN METABOLIC PANEL: CPT

## 2020-05-12 PROCEDURE — 86300 IMMUNOASSAY TUMOR CA 15-3: CPT

## 2020-05-13 LAB — CANCER AG15-3 SERPL-ACNC: 19.4 U/ML (ref 0–25)

## 2020-12-03 ENCOUNTER — HOSPITAL ENCOUNTER (OUTPATIENT)
Age: 65
End: 2020-12-03
Payer: MEDICARE

## 2020-12-03 VITALS — BODY MASS INDEX: 33.2 KG/M2

## 2020-12-03 DIAGNOSIS — N60.02: ICD-10-CM

## 2020-12-03 DIAGNOSIS — R92.8: Primary | ICD-10-CM

## 2020-12-03 DIAGNOSIS — N63.20: ICD-10-CM

## 2020-12-03 PROCEDURE — 76642 ULTRASOUND BREAST LIMITED: CPT

## 2020-12-03 PROCEDURE — 77065 DX MAMMO INCL CAD UNI: CPT

## 2020-12-03 NOTE — DI.US.S_ITS
Patient Name:  
NATHANAEL ANGEL  
MRN: [id number] YOB: 1955 Sex: M  
Attending Physician: Sarah  
Indications:  
Date: 12/03/2020 15:17  
At the request of:  
MARIIA COWAN  
Procedure: US breast LT limited  
  
ULTRASOUND OF LEFT BREAST: 12/3/2020  
  
CLINICAL: Palpable left breast lump.  
  
Comparison is made to exams dated: 12/3/2020 mammogram, 10/15/2019 mammogram, 
and 8/22/2019  
mammogram - [facility].  
  
Color flow ultrasound of the left breast was performed. Gray scale images of the
real-time examination  
were reviewed.  
  
There are multiple clusters of 2 mm to 5 mm cysts in the left breast central to 
the nipple anterior depth.  
  
IMPRESSION: PROBABLY BENIGN  
  
The multiple clusters of 2 mm to 5 mm cysts in the left breast are consistent 
with a complex cyst and are  
probably benign.  
  
Recommend clinical follow-up. Also a follow-up ultrasound in 6 months is 
recommended to demonstrate  
stability.  
  
This exam was interpreted at Station ID: [id number].  
  
Electronically Signed By: Polo Amaro  
acr/:12/3/2020 15:50:13  
copy to: SUSY DAMON  
copy to: Delonte Khalil  
letter sent: Followup Recommended  
Ultrasound BI-RADS: 3 Probably benign  
 MTDD

## 2020-12-03 NOTE — DI.MG.S_ITS
MALE UNILATERAL LEFT DIGITAL DIAGNOSTIC MAMMOGRAM 3D/2D POST MASTECTOMY: 12/3/2020  
CLINICAL: Left breast lump.    
   
Comparison is made to exams dated:  10/15/2019 mammogram and 8/22/2019 mammogram - [facility].    
There are multiple clusters of 2 mm to 5 mm cysts with a circumscribed margin in the left   
breast central to the nipple anterior depth.    
No other significant masses or calcifications are seen in the breast.    
   
IMPRESSION: INCOMPLETE: NEEDS ADDITIONAL IMAGING EVALUATION  
The multiple clusters of 2 mm to 5 mm cysts in the left breast are indeterminate.  US   
will be performed and dictated separately.    
   
   
This exam was interpreted at Station ID: [id number].    
   
NOTE: For mammograms, a report in lay terms will be sent to the patient. Approximately   
15% of breast malignancies will not be visualized mammographically. In the management of   
a palpable breast mass, a negative mammogram must not discourage biopsy of a clinically   
suspicious lesion.  
   
Electronically Signed By: Polo Amaro            
acr/:12/3/2020 15:34:57    
copy to: SUSY DAMON  
copy to: Delonte Khalil  
   
   
letter sent: Additional Imaging Needed    
ACR BI-RADS Category 0: Incomplete 3340F

## 2020-12-03 NOTE — DI.US.S_ITS
PROCEDURE: US BREAST LT LIMITED  
   
COMPARISON: None.  
   
INDICATIONS: LEFT BREAST MASS  
   
FINDINGS:   
   
IMPRESSION:   
   
   
   
Dictated by: Polo Amaro M.D. on 12/03/2020 at 15:47       
Approved by: Polo Amaro M.D. on 12/03/2020 at 15:50

## 2021-03-16 ENCOUNTER — HOSPITAL ENCOUNTER (OUTPATIENT)
Age: 66
End: 2021-03-16
Payer: MEDICARE

## 2021-03-16 VITALS — BODY MASS INDEX: 33.2 KG/M2

## 2021-03-16 DIAGNOSIS — R06.02: Primary | ICD-10-CM

## 2021-03-16 DIAGNOSIS — Z98.1: ICD-10-CM

## 2021-03-16 DIAGNOSIS — R91.8: ICD-10-CM

## 2021-03-16 DIAGNOSIS — J90: ICD-10-CM

## 2021-03-16 PROCEDURE — 71046 X-RAY EXAM CHEST 2 VIEWS: CPT

## 2021-03-22 ENCOUNTER — HOSPITAL ENCOUNTER (OUTPATIENT)
Age: 66
End: 2021-03-22
Payer: MEDICARE

## 2021-03-22 VITALS — BODY MASS INDEX: 33.2 KG/M2

## 2021-03-22 DIAGNOSIS — J18.9: Primary | ICD-10-CM

## 2021-03-22 DIAGNOSIS — Z98.1: ICD-10-CM

## 2021-03-22 PROCEDURE — 71046 X-RAY EXAM CHEST 2 VIEWS: CPT

## 2021-04-15 ENCOUNTER — HOSPITAL ENCOUNTER (OUTPATIENT)
Age: 66
End: 2021-04-15
Payer: MEDICARE

## 2021-04-15 VITALS — BODY MASS INDEX: 33.2 KG/M2

## 2021-04-15 DIAGNOSIS — Z99.2: ICD-10-CM

## 2021-04-15 DIAGNOSIS — N18.6: ICD-10-CM

## 2021-04-15 DIAGNOSIS — I70.291: Primary | ICD-10-CM

## 2021-04-15 DIAGNOSIS — T87.89: ICD-10-CM

## 2021-04-15 DIAGNOSIS — Z89.511: ICD-10-CM

## 2021-04-15 DIAGNOSIS — L97.812: ICD-10-CM

## 2021-04-15 DIAGNOSIS — L08.9: ICD-10-CM

## 2021-04-15 DIAGNOSIS — I48.91: ICD-10-CM

## 2021-04-15 PROCEDURE — 99204 OFFICE O/P NEW MOD 45 MIN: CPT

## 2021-04-15 PROCEDURE — 87070 CULTURE OTHR SPECIMN AEROBIC: CPT

## 2021-04-15 PROCEDURE — 87077 CULTURE AEROBIC IDENTIFY: CPT

## 2021-04-15 PROCEDURE — 87186 SC STD MICRODIL/AGAR DIL: CPT

## 2021-04-15 PROCEDURE — 87205 SMEAR GRAM STAIN: CPT

## 2021-04-15 PROCEDURE — 11042 DBRDMT SUBQ TIS 1ST 20SQCM/<: CPT

## 2021-04-15 PROCEDURE — 99213 OFFICE O/P EST LOW 20 MIN: CPT

## 2021-04-22 ENCOUNTER — HOSPITAL ENCOUNTER (OUTPATIENT)
Age: 66
End: 2021-04-22
Payer: MEDICARE

## 2021-04-22 VITALS — BODY MASS INDEX: 33.2 KG/M2

## 2021-04-22 DIAGNOSIS — S81.801A: ICD-10-CM

## 2021-04-22 DIAGNOSIS — B95.2: ICD-10-CM

## 2021-04-22 DIAGNOSIS — S31.511A: ICD-10-CM

## 2021-04-22 DIAGNOSIS — L08.9: ICD-10-CM

## 2021-04-22 DIAGNOSIS — B37.2: ICD-10-CM

## 2021-04-22 DIAGNOSIS — Z89.511: ICD-10-CM

## 2021-04-22 DIAGNOSIS — I70.291: Primary | ICD-10-CM

## 2021-04-22 DIAGNOSIS — L97.812: ICD-10-CM

## 2021-04-22 DIAGNOSIS — T87.89: ICD-10-CM

## 2021-04-22 PROCEDURE — 99214 OFFICE O/P EST MOD 30 MIN: CPT

## 2021-04-22 PROCEDURE — 11042 DBRDMT SUBQ TIS 1ST 20SQCM/<: CPT

## 2021-04-27 ENCOUNTER — HOSPITAL ENCOUNTER (OUTPATIENT)
Age: 66
End: 2021-04-27
Payer: MEDICARE

## 2021-04-27 VITALS — BODY MASS INDEX: 33.2 KG/M2

## 2021-04-27 DIAGNOSIS — S91.302A: ICD-10-CM

## 2021-04-27 DIAGNOSIS — T87.89: ICD-10-CM

## 2021-04-27 DIAGNOSIS — L08.9: ICD-10-CM

## 2021-04-27 DIAGNOSIS — I70.291: Primary | ICD-10-CM

## 2021-04-27 DIAGNOSIS — B95.2: ICD-10-CM

## 2021-04-27 DIAGNOSIS — Z89.511: ICD-10-CM

## 2021-04-27 PROCEDURE — 11042 DBRDMT SUBQ TIS 1ST 20SQCM/<: CPT

## 2021-04-27 PROCEDURE — 97605 NEG PRS WND THER DME<=50SQCM: CPT

## 2021-04-30 ENCOUNTER — HOSPITAL ENCOUNTER (OUTPATIENT)
Age: 66
End: 2021-04-30
Payer: MEDICARE

## 2021-04-30 VITALS — BODY MASS INDEX: 33.2 KG/M2

## 2021-04-30 DIAGNOSIS — S91.301A: Primary | ICD-10-CM

## 2021-04-30 PROCEDURE — 97605 NEG PRS WND THER DME<=50SQCM: CPT

## 2021-05-04 ENCOUNTER — HOSPITAL ENCOUNTER (OUTPATIENT)
Age: 66
End: 2021-05-04
Payer: MEDICARE

## 2021-05-04 VITALS — BODY MASS INDEX: 33.2 KG/M2

## 2021-05-04 DIAGNOSIS — T81.31XA: Primary | ICD-10-CM

## 2021-05-04 PROCEDURE — 99213 OFFICE O/P EST LOW 20 MIN: CPT

## 2021-05-09 ENCOUNTER — HOSPITAL ENCOUNTER (EMERGENCY)
Age: 66
LOS: 1 days | Discharge: ANOTHER HOSPITAL | End: 2021-05-10
Payer: MEDICARE

## 2021-05-09 VITALS
DIASTOLIC BLOOD PRESSURE: 93 MMHG | OXYGEN SATURATION: 99 % | SYSTOLIC BLOOD PRESSURE: 143 MMHG | HEART RATE: 98 BPM | RESPIRATION RATE: 25 BRPM

## 2021-05-09 VITALS
RESPIRATION RATE: 24 BRPM | HEART RATE: 105 BPM | DIASTOLIC BLOOD PRESSURE: 100 MMHG | OXYGEN SATURATION: 98 % | SYSTOLIC BLOOD PRESSURE: 156 MMHG

## 2021-05-09 VITALS — OXYGEN SATURATION: 99 % | HEART RATE: 98 BPM | RESPIRATION RATE: 30 BRPM

## 2021-05-09 VITALS
OXYGEN SATURATION: 99 % | RESPIRATION RATE: 21 BRPM | HEART RATE: 96 BPM | SYSTOLIC BLOOD PRESSURE: 156 MMHG | DIASTOLIC BLOOD PRESSURE: 76 MMHG

## 2021-05-09 VITALS — OXYGEN SATURATION: 98 % | RESPIRATION RATE: 33 BRPM | HEART RATE: 101 BPM

## 2021-05-09 VITALS
OXYGEN SATURATION: 99 % | RESPIRATION RATE: 25 BRPM | DIASTOLIC BLOOD PRESSURE: 67 MMHG | SYSTOLIC BLOOD PRESSURE: 160 MMHG | HEART RATE: 101 BPM

## 2021-05-09 VITALS — HEART RATE: 96 BPM | OXYGEN SATURATION: 100 % | RESPIRATION RATE: 25 BRPM

## 2021-05-09 VITALS
RESPIRATION RATE: 33 BRPM | DIASTOLIC BLOOD PRESSURE: 78 MMHG | OXYGEN SATURATION: 99 % | SYSTOLIC BLOOD PRESSURE: 164 MMHG | HEART RATE: 101 BPM

## 2021-05-09 VITALS — HEART RATE: 104 BPM | RESPIRATION RATE: 24 BRPM

## 2021-05-09 VITALS
DIASTOLIC BLOOD PRESSURE: 73 MMHG | SYSTOLIC BLOOD PRESSURE: 154 MMHG | HEART RATE: 102 BPM | RESPIRATION RATE: 31 BRPM | OXYGEN SATURATION: 99 %

## 2021-05-09 VITALS — HEART RATE: 99 BPM | OXYGEN SATURATION: 99 % | RESPIRATION RATE: 22 BRPM

## 2021-05-09 VITALS
TEMPERATURE: 97.88 F | RESPIRATION RATE: 24 BRPM | DIASTOLIC BLOOD PRESSURE: 82 MMHG | HEART RATE: 97 BPM | OXYGEN SATURATION: 100 % | SYSTOLIC BLOOD PRESSURE: 137 MMHG

## 2021-05-09 VITALS — SYSTOLIC BLOOD PRESSURE: 160 MMHG | RESPIRATION RATE: 22 BRPM | DIASTOLIC BLOOD PRESSURE: 70 MMHG | HEART RATE: 99 BPM

## 2021-05-09 VITALS
HEART RATE: 101 BPM | SYSTOLIC BLOOD PRESSURE: 141 MMHG | OXYGEN SATURATION: 97 % | RESPIRATION RATE: 28 BRPM | DIASTOLIC BLOOD PRESSURE: 70 MMHG

## 2021-05-09 VITALS — OXYGEN SATURATION: 83 % | RESPIRATION RATE: 29 BRPM | HEART RATE: 117 BPM

## 2021-05-09 VITALS
SYSTOLIC BLOOD PRESSURE: 164 MMHG | HEART RATE: 95 BPM | RESPIRATION RATE: 27 BRPM | DIASTOLIC BLOOD PRESSURE: 91 MMHG | OXYGEN SATURATION: 99 %

## 2021-05-09 VITALS — RESPIRATION RATE: 22 BRPM | OXYGEN SATURATION: 98 % | HEART RATE: 94 BPM

## 2021-05-09 VITALS — BODY MASS INDEX: 33.2 KG/M2 | BODY MASS INDEX: 31.6 KG/M2

## 2021-05-09 VITALS — HEART RATE: 101 BPM | OXYGEN SATURATION: 99 % | RESPIRATION RATE: 25 BRPM

## 2021-05-09 DIAGNOSIS — R06.00: ICD-10-CM

## 2021-05-09 DIAGNOSIS — R07.9: ICD-10-CM

## 2021-05-09 DIAGNOSIS — Z20.822: ICD-10-CM

## 2021-05-09 DIAGNOSIS — T87.40: ICD-10-CM

## 2021-05-09 DIAGNOSIS — J90: Primary | ICD-10-CM

## 2021-05-09 LAB
ADD MANUAL DIFF / SLIDE REVIEW: NO
ALBUMIN SERPL-MCNC: 3.5 G/DL (ref 3.5–5)
ALBUMIN/GLOB SERPL: 1 {RATIO} (ref 1–2.8)
ALP SERPL-CCNC: 78 U/L (ref 38–126)
ALT SERPL-CCNC: 24 IU/L (ref ?–50)
BUN SERPL-MCNC: 34 MG/DL (ref 9–20)
CALCIUM SERPL-MCNC: 9.6 MG/DL (ref 8.4–10.2)
CHLORIDE SERPL-SCNC: 97 MMOL/L (ref 98–107)
CK SERPL-CCNC: 21 U/L (ref 55–170)
CKMB % RELATIVE INDEX: (no result) % (ref 1.5–5)
CO2 SERPL-SCNC: 32 MMOL/L (ref 22–32)
ESTIMATED GLOMERULAR FILT RATE: 8.3 ML/MIN (ref 60–?)
GLOBULIN SER CALC-MCNC: 3.5 G/DL (ref 1.7–4.1)
GLUCOSE SERPL-MCNC: 95 MG/DL (ref 80–110)
HEMATOCRIT: 28.8 % (ref 41–53)
HEMOGLOBIN: 9.3 G/DL (ref 13.5–17.5)
HEMOLYSIS: < 15 (ref 0–50)
INR PPP: 1.2 (ref 0.9–1.3)
LACTATE SERPL-MCNC: 1.5 MMOL/L (ref 0.7–2.1)
LIPASE SERPL-CCNC: 43 U/L (ref 23–300)
LYMPHOCYTES # SPEC AUTO: 500 /UL (ref 1100–4500)
MAGNESIUM SERPL-MCNC: 1.9 MG/DL (ref 1.6–2.3)
MCV RBC: 84.5 FL (ref 80–100)
MEAN CORPUSCULAR HEMOGLOBIN: 27.1 PG (ref 26–34)
MEAN CORPUSCULAR HGB CONC: 32.1 % (ref 30–36)
NT-PROBNP SERPL-MCNC: (no result) PG/ML (ref ?–125)
PLATELET COUNT: 106 X10^3/UL (ref 150–400)
POTASSIUM SERPL-SCNC: 4.9 MMOL/L (ref 3.4–5.1)
PROCALCITONIN SERPL-MCNC: 0.51 NG/ML (ref ?–0.5)
PROT SERPL-MCNC: 7 G/DL (ref 6.3–8.2)
PROTHROMBIN TIME: 13.9 SECONDS (ref 10.1–12.7)
PTT PARTIAL THROMBOPLASTIN TIM: 28 SECONDS (ref 26.4–36.2)
SODIUM SERPL-SCNC: 138 MMOL/L (ref 137–145)
TROPONIN I SERPL-MCNC: 0.02 NG/ML (ref 0.01–0.03)

## 2021-05-09 PROCEDURE — 96366 THER/PROPH/DIAG IV INF ADDON: CPT

## 2021-05-09 PROCEDURE — 93005 ELECTROCARDIOGRAM TRACING: CPT

## 2021-05-09 PROCEDURE — 87635 SARS-COV-2 COVID-19 AMP PRB: CPT

## 2021-05-09 PROCEDURE — 36415 COLL VENOUS BLD VENIPUNCTURE: CPT

## 2021-05-09 PROCEDURE — 96365 THER/PROPH/DIAG IV INF INIT: CPT

## 2021-05-09 PROCEDURE — 84484 ASSAY OF TROPONIN QUANT: CPT

## 2021-05-09 PROCEDURE — 83605 ASSAY OF LACTIC ACID: CPT

## 2021-05-09 PROCEDURE — 80053 COMPREHEN METABOLIC PANEL: CPT

## 2021-05-09 PROCEDURE — 83735 ASSAY OF MAGNESIUM: CPT

## 2021-05-09 PROCEDURE — 84145 PROCALCITONIN (PCT): CPT

## 2021-05-09 PROCEDURE — 85025 COMPLETE CBC W/AUTO DIFF WBC: CPT

## 2021-05-09 PROCEDURE — 96375 TX/PRO/DX INJ NEW DRUG ADDON: CPT

## 2021-05-09 PROCEDURE — 85610 PROTHROMBIN TIME: CPT

## 2021-05-09 PROCEDURE — 93010 ELECTROCARDIOGRAM REPORT: CPT

## 2021-05-09 PROCEDURE — 83690 ASSAY OF LIPASE: CPT

## 2021-05-09 PROCEDURE — 82550 ASSAY OF CK (CPK): CPT

## 2021-05-09 PROCEDURE — 71045 X-RAY EXAM CHEST 1 VIEW: CPT

## 2021-05-09 PROCEDURE — 99285 EMERGENCY DEPT VISIT HI MDM: CPT

## 2021-05-09 PROCEDURE — 83880 ASSAY OF NATRIURETIC PEPTIDE: CPT

## 2021-05-09 PROCEDURE — 87040 BLOOD CULTURE FOR BACTERIA: CPT

## 2021-05-09 PROCEDURE — 85730 THROMBOPLASTIN TIME PARTIAL: CPT

## 2021-05-10 VITALS
OXYGEN SATURATION: 99 % | RESPIRATION RATE: 25 BRPM | SYSTOLIC BLOOD PRESSURE: 123 MMHG | HEART RATE: 94 BPM | DIASTOLIC BLOOD PRESSURE: 75 MMHG

## 2021-05-10 VITALS
HEART RATE: 93 BPM | SYSTOLIC BLOOD PRESSURE: 125 MMHG | OXYGEN SATURATION: 97 % | RESPIRATION RATE: 19 BRPM | DIASTOLIC BLOOD PRESSURE: 76 MMHG

## 2021-05-10 VITALS — OXYGEN SATURATION: 99 % | HEART RATE: 98 BPM | RESPIRATION RATE: 25 BRPM

## 2021-05-10 VITALS
DIASTOLIC BLOOD PRESSURE: 65 MMHG | OXYGEN SATURATION: 93 % | HEART RATE: 101 BPM | SYSTOLIC BLOOD PRESSURE: 148 MMHG | RESPIRATION RATE: 27 BRPM

## 2021-05-10 VITALS
RESPIRATION RATE: 21 BRPM | DIASTOLIC BLOOD PRESSURE: 77 MMHG | SYSTOLIC BLOOD PRESSURE: 147 MMHG | OXYGEN SATURATION: 97 % | HEART RATE: 93 BPM

## 2021-05-10 VITALS
SYSTOLIC BLOOD PRESSURE: 145 MMHG | HEART RATE: 97 BPM | RESPIRATION RATE: 21 BRPM | DIASTOLIC BLOOD PRESSURE: 77 MMHG | OXYGEN SATURATION: 91 %

## 2021-05-10 VITALS
DIASTOLIC BLOOD PRESSURE: 77 MMHG | SYSTOLIC BLOOD PRESSURE: 137 MMHG | RESPIRATION RATE: 20 BRPM | HEART RATE: 97 BPM | OXYGEN SATURATION: 88 %

## 2021-05-10 VITALS — DIASTOLIC BLOOD PRESSURE: 76 MMHG | HEART RATE: 99 BPM | RESPIRATION RATE: 45 BRPM | SYSTOLIC BLOOD PRESSURE: 129 MMHG

## 2021-05-10 VITALS — RESPIRATION RATE: 31 BRPM | OXYGEN SATURATION: 95 % | HEART RATE: 101 BPM

## 2021-05-10 VITALS — OXYGEN SATURATION: 88 % | RESPIRATION RATE: 38 BRPM | HEART RATE: 96 BPM

## 2021-05-10 VITALS — OXYGEN SATURATION: 97 % | RESPIRATION RATE: 23 BRPM | HEART RATE: 92 BPM

## 2021-06-03 ENCOUNTER — HOSPITAL ENCOUNTER (OUTPATIENT)
Age: 66
End: 2021-06-03
Payer: MEDICARE

## 2021-06-03 VITALS — BODY MASS INDEX: 33.2 KG/M2

## 2021-06-03 DIAGNOSIS — T87.89: ICD-10-CM

## 2021-06-03 DIAGNOSIS — Z89.511: ICD-10-CM

## 2021-06-03 DIAGNOSIS — S81.801A: ICD-10-CM

## 2021-06-03 DIAGNOSIS — I73.9: Primary | ICD-10-CM

## 2021-06-03 PROCEDURE — 11042 DBRDMT SUBQ TIS 1ST 20SQCM/<: CPT

## 2021-06-18 ENCOUNTER — HOSPITAL ENCOUNTER (EMERGENCY)
Age: 66
LOS: 1 days | Discharge: HOME | End: 2021-06-19
Payer: MEDICARE

## 2021-06-18 VITALS
DIASTOLIC BLOOD PRESSURE: 59 MMHG | SYSTOLIC BLOOD PRESSURE: 127 MMHG | RESPIRATION RATE: 16 BRPM | OXYGEN SATURATION: 96 % | HEART RATE: 105 BPM

## 2021-06-18 VITALS
OXYGEN SATURATION: 99 % | RESPIRATION RATE: 23 BRPM | SYSTOLIC BLOOD PRESSURE: 111 MMHG | DIASTOLIC BLOOD PRESSURE: 89 MMHG | HEART RATE: 105 BPM

## 2021-06-18 VITALS — HEART RATE: 103 BPM | RESPIRATION RATE: 23 BRPM

## 2021-06-18 VITALS — RESPIRATION RATE: 24 BRPM | HEART RATE: 101 BPM

## 2021-06-18 VITALS — OXYGEN SATURATION: 78 % | RESPIRATION RATE: 24 BRPM | HEART RATE: 104 BPM

## 2021-06-18 VITALS — BODY MASS INDEX: 33.2 KG/M2

## 2021-06-18 VITALS — HEART RATE: 103 BPM | DIASTOLIC BLOOD PRESSURE: 77 MMHG | SYSTOLIC BLOOD PRESSURE: 102 MMHG | RESPIRATION RATE: 21 BRPM

## 2021-06-18 VITALS
OXYGEN SATURATION: 91 % | HEART RATE: 107 BPM | SYSTOLIC BLOOD PRESSURE: 127 MMHG | DIASTOLIC BLOOD PRESSURE: 59 MMHG | RESPIRATION RATE: 22 BRPM

## 2021-06-18 VITALS — HEART RATE: 105 BPM | OXYGEN SATURATION: 98 %

## 2021-06-18 VITALS — HEART RATE: 104 BPM | RESPIRATION RATE: 23 BRPM

## 2021-06-18 VITALS — HEART RATE: 107 BPM

## 2021-06-18 DIAGNOSIS — E87.6: Primary | ICD-10-CM

## 2021-06-18 DIAGNOSIS — R42: ICD-10-CM

## 2021-06-18 LAB
ADD MANUAL DIFF / SLIDE REVIEW: NO
ALBUMIN SERPL-MCNC: 3.9 G/DL (ref 3.5–5)
ALBUMIN/GLOB SERPL: 0.9 {RATIO} (ref 1–2.8)
ALP SERPL-CCNC: 99 U/L (ref 38–126)
ALT SERPL-CCNC: 24 IU/L (ref ?–50)
BUN SERPL-MCNC: 24 MG/DL (ref 9–20)
CALCIUM SERPL-MCNC: 9.7 MG/DL (ref 8.4–10.2)
CHLORIDE SERPL-SCNC: 93 MMOL/L (ref 98–107)
CK SERPL-CCNC: < 20 U/L (ref 55–170)
CKMB % RELATIVE INDEX: (no result) % (ref 1.5–5)
CO2 SERPL-SCNC: 32 MMOL/L (ref 22–32)
ESTIMATED GLOMERULAR FILT RATE: 12.5 ML/MIN (ref 60–?)
GLOBULIN SER CALC-MCNC: 4.3 G/DL (ref 1.7–4.1)
GLUCOSE SERPL-MCNC: 94 MG/DL (ref 80–110)
HEMATOCRIT: 29.8 % (ref 41–53)
HEMOGLOBIN: 9.5 G/DL (ref 13.5–17.5)
HEMOLYSIS: < 15 (ref 0–50)
LACTATE SERPL-MCNC: 1.2 MMOL/L (ref 0.7–2.1)
LYMPHOCYTES # SPEC AUTO: 300 /UL (ref 1100–4500)
MAGNESIUM SERPL-MCNC: 1.7 MG/DL (ref 1.6–2.3)
MCV RBC: 81.7 FL (ref 80–100)
MEAN CORPUSCULAR HEMOGLOBIN: 26 PG (ref 26–34)
MEAN CORPUSCULAR HGB CONC: 31.8 % (ref 30–36)
NT-PROBNP SERPL-MCNC: (no result) PG/ML (ref ?–125)
PHOSPHATE SERPL-MCNC: 4 MG/DL (ref 2.3–3.7)
PLATELET COUNT: 235 X10^3/UL (ref 150–400)
POTASSIUM SERPL-SCNC: 4.5 MMOL/L (ref 3.4–5.1)
PROT SERPL-MCNC: 8.2 G/DL (ref 6.3–8.2)
SODIUM SERPL-SCNC: 134 MMOL/L (ref 137–145)
TROPONIN I SERPL-MCNC: 0.03 NG/ML (ref 0.01–0.03)

## 2021-06-18 PROCEDURE — 71045 X-RAY EXAM CHEST 1 VIEW: CPT

## 2021-06-18 PROCEDURE — 82550 ASSAY OF CK (CPK): CPT

## 2021-06-18 PROCEDURE — 83605 ASSAY OF LACTIC ACID: CPT

## 2021-06-18 PROCEDURE — 83735 ASSAY OF MAGNESIUM: CPT

## 2021-06-18 PROCEDURE — 87150 DNA/RNA AMPLIFIED PROBE: CPT

## 2021-06-18 PROCEDURE — 83880 ASSAY OF NATRIURETIC PEPTIDE: CPT

## 2021-06-18 PROCEDURE — 93005 ELECTROCARDIOGRAM TRACING: CPT

## 2021-06-18 PROCEDURE — 99284 EMERGENCY DEPT VISIT MOD MDM: CPT

## 2021-06-18 PROCEDURE — 84484 ASSAY OF TROPONIN QUANT: CPT

## 2021-06-18 PROCEDURE — 99283 EMERGENCY DEPT VISIT LOW MDM: CPT

## 2021-06-18 PROCEDURE — 87040 BLOOD CULTURE FOR BACTERIA: CPT

## 2021-06-18 PROCEDURE — 80053 COMPREHEN METABOLIC PANEL: CPT

## 2021-06-18 PROCEDURE — 36415 COLL VENOUS BLD VENIPUNCTURE: CPT

## 2021-06-18 PROCEDURE — 87205 SMEAR GRAM STAIN: CPT

## 2021-06-18 PROCEDURE — 85025 COMPLETE CBC W/AUTO DIFF WBC: CPT

## 2021-06-18 PROCEDURE — 93010 ELECTROCARDIOGRAM REPORT: CPT

## 2021-06-18 PROCEDURE — 82962 GLUCOSE BLOOD TEST: CPT

## 2021-06-18 PROCEDURE — 84100 ASSAY OF PHOSPHORUS: CPT

## 2021-06-19 VITALS — RESPIRATION RATE: 22 BRPM | HEART RATE: 101 BPM

## 2021-06-19 VITALS — RESPIRATION RATE: 19 BRPM | HEART RATE: 98 BPM

## 2021-06-19 VITALS — HEART RATE: 102 BPM | RESPIRATION RATE: 20 BRPM

## 2021-06-24 ENCOUNTER — HOSPITAL ENCOUNTER (OUTPATIENT)
Age: 66
End: 2021-06-24
Payer: MEDICARE

## 2021-06-24 VITALS — BODY MASS INDEX: 33.2 KG/M2

## 2021-06-24 DIAGNOSIS — I70.291: Primary | ICD-10-CM

## 2021-06-24 DIAGNOSIS — T87.89: ICD-10-CM

## 2021-06-24 DIAGNOSIS — Z89.511: ICD-10-CM

## 2021-06-24 DIAGNOSIS — L97.812: ICD-10-CM

## 2021-06-24 PROCEDURE — 99213 OFFICE O/P EST LOW 20 MIN: CPT

## 2021-09-16 ENCOUNTER — HOSPITAL ENCOUNTER (EMERGENCY)
Age: 66
Discharge: HOME | End: 2021-09-16
Payer: MEDICARE

## 2021-09-16 VITALS
RESPIRATION RATE: 14 BRPM | DIASTOLIC BLOOD PRESSURE: 76 MMHG | SYSTOLIC BLOOD PRESSURE: 114 MMHG | HEART RATE: 75 BPM | OXYGEN SATURATION: 98 % | TEMPERATURE: 98.2 F

## 2021-09-16 VITALS — BODY MASS INDEX: 33.2 KG/M2

## 2021-09-16 VITALS
OXYGEN SATURATION: 100 % | TEMPERATURE: 98.42 F | SYSTOLIC BLOOD PRESSURE: 118 MMHG | RESPIRATION RATE: 14 BRPM | DIASTOLIC BLOOD PRESSURE: 67 MMHG | HEART RATE: 88 BPM

## 2021-09-16 DIAGNOSIS — K92.1: Primary | ICD-10-CM

## 2021-09-16 PROCEDURE — 99281 EMR DPT VST MAYX REQ PHY/QHP: CPT

## 2023-07-22 ENCOUNTER — HOSPITAL ENCOUNTER (EMERGENCY)
Age: 68
Discharge: ANOTHER HOSPITAL | End: 2023-07-22
Payer: MEDICARE

## 2023-07-22 VITALS
DIASTOLIC BLOOD PRESSURE: 75 MMHG | SYSTOLIC BLOOD PRESSURE: 121 MMHG | HEART RATE: 106 BPM | OXYGEN SATURATION: 97 % | RESPIRATION RATE: 20 BRPM

## 2023-07-22 VITALS
HEART RATE: 102 BPM | OXYGEN SATURATION: 100 % | RESPIRATION RATE: 20 BRPM | DIASTOLIC BLOOD PRESSURE: 75 MMHG | SYSTOLIC BLOOD PRESSURE: 136 MMHG

## 2023-07-22 VITALS
SYSTOLIC BLOOD PRESSURE: 110 MMHG | DIASTOLIC BLOOD PRESSURE: 59 MMHG | OXYGEN SATURATION: 99 % | HEART RATE: 89 BPM | RESPIRATION RATE: 21 BRPM

## 2023-07-22 VITALS
HEART RATE: 87 BPM | DIASTOLIC BLOOD PRESSURE: 54 MMHG | SYSTOLIC BLOOD PRESSURE: 110 MMHG | RESPIRATION RATE: 14 BRPM | OXYGEN SATURATION: 100 %

## 2023-07-22 VITALS
HEART RATE: 98 BPM | OXYGEN SATURATION: 99 % | DIASTOLIC BLOOD PRESSURE: 79 MMHG | RESPIRATION RATE: 19 BRPM | SYSTOLIC BLOOD PRESSURE: 127 MMHG

## 2023-07-22 VITALS
SYSTOLIC BLOOD PRESSURE: 102 MMHG | OXYGEN SATURATION: 100 % | DIASTOLIC BLOOD PRESSURE: 55 MMHG | RESPIRATION RATE: 18 BRPM | HEART RATE: 86 BPM

## 2023-07-22 VITALS
HEART RATE: 85 BPM | SYSTOLIC BLOOD PRESSURE: 90 MMHG | RESPIRATION RATE: 16 BRPM | OXYGEN SATURATION: 99 % | DIASTOLIC BLOOD PRESSURE: 54 MMHG

## 2023-07-22 VITALS
HEART RATE: 90 BPM | OXYGEN SATURATION: 99 % | SYSTOLIC BLOOD PRESSURE: 126 MMHG | RESPIRATION RATE: 12 BRPM | DIASTOLIC BLOOD PRESSURE: 76 MMHG

## 2023-07-22 VITALS
RESPIRATION RATE: 17 BRPM | DIASTOLIC BLOOD PRESSURE: 55 MMHG | SYSTOLIC BLOOD PRESSURE: 93 MMHG | HEART RATE: 111 BPM | OXYGEN SATURATION: 98 %

## 2023-07-22 VITALS
SYSTOLIC BLOOD PRESSURE: 92 MMHG | HEART RATE: 87 BPM | OXYGEN SATURATION: 92 % | TEMPERATURE: 97.6 F | DIASTOLIC BLOOD PRESSURE: 60 MMHG | RESPIRATION RATE: 20 BRPM

## 2023-07-22 VITALS
DIASTOLIC BLOOD PRESSURE: 70 MMHG | SYSTOLIC BLOOD PRESSURE: 111 MMHG | OXYGEN SATURATION: 100 % | HEART RATE: 90 BPM | RESPIRATION RATE: 20 BRPM

## 2023-07-22 VITALS
RESPIRATION RATE: 12 BRPM | DIASTOLIC BLOOD PRESSURE: 64 MMHG | OXYGEN SATURATION: 97 % | HEART RATE: 100 BPM | SYSTOLIC BLOOD PRESSURE: 97 MMHG

## 2023-07-22 VITALS — DIASTOLIC BLOOD PRESSURE: 73 MMHG | HEART RATE: 104 BPM | RESPIRATION RATE: 13 BRPM | SYSTOLIC BLOOD PRESSURE: 119 MMHG

## 2023-07-22 VITALS
OXYGEN SATURATION: 99 % | HEART RATE: 102 BPM | SYSTOLIC BLOOD PRESSURE: 134 MMHG | RESPIRATION RATE: 12 BRPM | DIASTOLIC BLOOD PRESSURE: 73 MMHG

## 2023-07-22 VITALS
OXYGEN SATURATION: 96 % | HEART RATE: 109 BPM | SYSTOLIC BLOOD PRESSURE: 101 MMHG | RESPIRATION RATE: 15 BRPM | DIASTOLIC BLOOD PRESSURE: 64 MMHG

## 2023-07-22 VITALS — BODY MASS INDEX: 33.2 KG/M2

## 2023-07-22 VITALS — SYSTOLIC BLOOD PRESSURE: 131 MMHG | DIASTOLIC BLOOD PRESSURE: 71 MMHG | HEART RATE: 100 BPM | RESPIRATION RATE: 20 BRPM

## 2023-07-22 VITALS — RESPIRATION RATE: 18 BRPM | OXYGEN SATURATION: 100 % | HEART RATE: 86 BPM

## 2023-07-22 VITALS
OXYGEN SATURATION: 100 % | HEART RATE: 97 BPM | SYSTOLIC BLOOD PRESSURE: 137 MMHG | DIASTOLIC BLOOD PRESSURE: 68 MMHG | RESPIRATION RATE: 19 BRPM

## 2023-07-22 VITALS
DIASTOLIC BLOOD PRESSURE: 57 MMHG | OXYGEN SATURATION: 100 % | SYSTOLIC BLOOD PRESSURE: 102 MMHG | HEART RATE: 87 BPM | RESPIRATION RATE: 20 BRPM

## 2023-07-22 VITALS — HEART RATE: 103 BPM | SYSTOLIC BLOOD PRESSURE: 125 MMHG | RESPIRATION RATE: 19 BRPM | DIASTOLIC BLOOD PRESSURE: 97 MMHG

## 2023-07-22 VITALS
SYSTOLIC BLOOD PRESSURE: 94 MMHG | OXYGEN SATURATION: 98 % | HEART RATE: 85 BPM | DIASTOLIC BLOOD PRESSURE: 57 MMHG | RESPIRATION RATE: 19 BRPM

## 2023-07-22 VITALS — RESPIRATION RATE: 19 BRPM | SYSTOLIC BLOOD PRESSURE: 137 MMHG | DIASTOLIC BLOOD PRESSURE: 78 MMHG | HEART RATE: 104 BPM

## 2023-07-22 VITALS
RESPIRATION RATE: 18 BRPM | DIASTOLIC BLOOD PRESSURE: 60 MMHG | SYSTOLIC BLOOD PRESSURE: 92 MMHG | OXYGEN SATURATION: 100 % | HEART RATE: 78 BPM

## 2023-07-22 VITALS
DIASTOLIC BLOOD PRESSURE: 72 MMHG | OXYGEN SATURATION: 98 % | RESPIRATION RATE: 21 BRPM | HEART RATE: 95 BPM | SYSTOLIC BLOOD PRESSURE: 142 MMHG

## 2023-07-22 VITALS
SYSTOLIC BLOOD PRESSURE: 120 MMHG | RESPIRATION RATE: 18 BRPM | HEART RATE: 92 BPM | DIASTOLIC BLOOD PRESSURE: 66 MMHG | OXYGEN SATURATION: 97 %

## 2023-07-22 DIAGNOSIS — M54.2: ICD-10-CM

## 2023-07-22 DIAGNOSIS — S72.401A: Primary | ICD-10-CM
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
## 2023-07-22 LAB
ADD MANUAL DIFF / SLIDE REVIEW: NO
ALBUMIN SERPL-MCNC: 3.8 G/DL (ref 3.5–5)
ALBUMIN/GLOB SERPL: 1.1 {RATIO} (ref 1–2.8)
ALP SERPL-CCNC: 77 U/L (ref 38–126)
ALT SERPL-CCNC: 15 IU/L (ref ?–50)
BUN SERPL-MCNC: 52 MG/DL (ref 9–20)
CALCIUM SERPL-MCNC: 9.3 MG/DL (ref 8.4–10.2)
CHLORIDE SERPL-SCNC: 90 MMOL/L (ref 98–107)
CO2 SERPL-SCNC: 32 MMOL/L (ref 22–32)
ESTIMATED GLOMERULAR FILT RATE: 22 ML/MIN (ref 60–?)
GLOBULIN SER CALC-MCNC: 3.6 G/DL (ref 1.7–4.1)
GLUCOSE SERPL-MCNC: 117 MG/DL (ref 80–110)
HEMATOCRIT: 27.1 % (ref 41–53)
HEMOGLOBIN: 9.1 G/DL (ref 13.5–17.5)
HEMOLYSIS: < 15 (ref 0–50)
LYMPHOCYTES # SPEC AUTO: 300 /UL (ref 1100–4500)
MCV RBC: 90.7 FL (ref 80–100)
MEAN CORPUSCULAR HEMOGLOBIN: 30.5 PG (ref 26–34)
MEAN CORPUSCULAR HGB CONC: 33.7 % (ref 30–36)
PLATELET COUNT: 125 X10^3/UL (ref 150–400)
POTASSIUM SERPL-SCNC: 4.1 MMOL/L (ref 3.4–5.1)
PROT SERPL-MCNC: 7.4 G/DL (ref 6.3–8.2)
SODIUM SERPL-SCNC: 133 MMOL/L (ref 137–145)

## 2023-07-22 PROCEDURE — 96361 HYDRATE IV INFUSION ADD-ON: CPT

## 2023-07-22 PROCEDURE — 80053 COMPREHEN METABOLIC PANEL: CPT

## 2023-07-22 PROCEDURE — 70450 CT HEAD/BRAIN W/O DYE: CPT

## 2023-07-22 PROCEDURE — 96375 TX/PRO/DX INJ NEW DRUG ADDON: CPT

## 2023-07-22 PROCEDURE — 36415 COLL VENOUS BLD VENIPUNCTURE: CPT

## 2023-07-22 PROCEDURE — 85025 COMPLETE CBC W/AUTO DIFF WBC: CPT

## 2023-07-22 PROCEDURE — 96376 TX/PRO/DX INJ SAME DRUG ADON: CPT

## 2023-07-22 PROCEDURE — 99284 EMERGENCY DEPT VISIT MOD MDM: CPT

## 2023-07-22 PROCEDURE — 87635 SARS-COV-2 COVID-19 AMP PRB: CPT

## 2023-07-22 PROCEDURE — 72125 CT NECK SPINE W/O DYE: CPT

## 2023-07-22 PROCEDURE — 96374 THER/PROPH/DIAG INJ IV PUSH: CPT

## 2023-07-22 PROCEDURE — 73552 X-RAY EXAM OF FEMUR 2/>: CPT

## 2023-07-22 PROCEDURE — 29505 APPLICATION LONG LEG SPLINT: CPT
